# Patient Record
Sex: FEMALE | Race: WHITE | HISPANIC OR LATINO | URBAN - METROPOLITAN AREA
[De-identification: names, ages, dates, MRNs, and addresses within clinical notes are randomized per-mention and may not be internally consistent; named-entity substitution may affect disease eponyms.]

---

## 2019-01-02 ENCOUNTER — WALK IN (OUTPATIENT)
Dept: URGENT CARE | Age: 19
End: 2019-01-02

## 2019-01-02 DIAGNOSIS — Z11.1 SCREENING FOR TUBERCULOSIS: Primary | ICD-10-CM

## 2019-01-02 DIAGNOSIS — Z23 NEED FOR IMMUNIZATION AGAINST INFLUENZA: ICD-10-CM

## 2019-01-02 PROCEDURE — 90630 INFLUENZA QUADRIVALENT SPLIT P FREE INTRADERMAL VACCINE: CPT | Performed by: NURSE PRACTITIONER

## 2019-01-02 PROCEDURE — 86580 TB INTRADERMAL TEST: CPT | Performed by: NURSE PRACTITIONER

## 2019-01-05 ENCOUNTER — WALK IN (OUTPATIENT)
Dept: URGENT CARE | Age: 19
End: 2019-01-05

## 2019-01-05 DIAGNOSIS — Z11.1 VISIT FOR TB SKIN TEST: Primary | ICD-10-CM

## 2019-01-05 DIAGNOSIS — Z11.1 ENCOUNTER FOR PPD SKIN TEST READING: ICD-10-CM

## 2019-01-05 PROCEDURE — X1094 NO CHARGE VISIT: HCPCS | Performed by: NURSE PRACTITIONER

## 2019-01-25 ENCOUNTER — OFFICE VISIT (OUTPATIENT)
Dept: FAMILY MEDICINE CLINIC | Facility: CLINIC | Age: 19
End: 2019-01-25

## 2019-01-25 VITALS
WEIGHT: 161.81 LBS | SYSTOLIC BLOOD PRESSURE: 114 MMHG | HEIGHT: 72 IN | TEMPERATURE: 98 F | RESPIRATION RATE: 14 BRPM | BODY MASS INDEX: 21.92 KG/M2 | DIASTOLIC BLOOD PRESSURE: 78 MMHG | HEART RATE: 78 BPM | OXYGEN SATURATION: 98 %

## 2019-01-25 DIAGNOSIS — Z02.0 SCHOOL PHYSICAL EXAM: Primary | ICD-10-CM

## 2019-01-25 PROCEDURE — 99385 PREV VISIT NEW AGE 18-39: CPT | Performed by: NURSE PRACTITIONER

## 2019-01-25 NOTE — PROGRESS NOTES
CHIEF COMPLAINT:   Patient presents with:  School Physical: up to date vaccines, [employer], does not require titers       HPI:   Hair Katz is a 19year old female who presents for school physical exam. Patient is attending [employer]  ALLERGY/IMM.:  denies food or seasonal allergies    EXAM:   /78   Pulse 78   Temp 98.3 °F (36.8 °C) (Oral)   Resp 14   Ht 72\"   Wt 161 lb 12.8 oz   LMP 01/20/2019   SpO2 98%   Breastfeeding?  No   BMI 21.94 kg/m²   Visual Acuity     [employer] S provide her school with titers showing immunity of which can not be completed here, pt voiced understanding. Form filled out and given to patient. Copy of form sent to be scanned into patient's chart.      Patient needs to f/u with PCP for any chronic

## 2019-02-04 ENCOUNTER — WALK IN (OUTPATIENT)
Dept: URGENT CARE | Age: 19
End: 2019-02-04

## 2019-02-04 DIAGNOSIS — Z11.1 SCREENING-PULMONARY TB: Primary | ICD-10-CM

## 2019-02-04 PROCEDURE — 86580 TB INTRADERMAL TEST: CPT | Performed by: NURSE PRACTITIONER

## 2019-02-06 ENCOUNTER — WALK IN (OUTPATIENT)
Dept: URGENT CARE | Age: 19
End: 2019-02-06

## 2019-02-06 DIAGNOSIS — Z11.1 ENCOUNTER FOR PPD SKIN TEST READING: Primary | ICD-10-CM

## 2019-02-06 PROCEDURE — X1094 NO CHARGE VISIT: HCPCS | Performed by: NURSE PRACTITIONER

## 2019-05-28 ENCOUNTER — WALK IN (OUTPATIENT)
Dept: URGENT CARE | Age: 19
End: 2019-05-28

## 2019-05-28 DIAGNOSIS — Z23 NEED FOR TDAP VACCINATION: Primary | ICD-10-CM

## 2019-05-28 PROCEDURE — 90715 TDAP VACCINE 7 YRS/> IM: CPT | Performed by: NURSE PRACTITIONER

## 2019-05-28 PROCEDURE — 90471 IMMUNIZATION ADMIN: CPT | Performed by: NURSE PRACTITIONER

## 2020-09-27 ENCOUNTER — TELEPHONE (OUTPATIENT)
Dept: SCHEDULING | Age: 20
End: 2020-09-27

## 2020-09-27 ENCOUNTER — WALK IN (OUTPATIENT)
Dept: URGENT CARE | Age: 20
End: 2020-09-27

## 2020-09-27 VITALS — TEMPERATURE: 98.4 F

## 2020-09-27 DIAGNOSIS — Z11.1 SCREENING-PULMONARY TB: Primary | ICD-10-CM

## 2020-09-27 PROCEDURE — 86580 TB INTRADERMAL TEST: CPT | Performed by: NURSE PRACTITIONER

## 2020-09-30 ENCOUNTER — WALK IN (OUTPATIENT)
Dept: URGENT CARE | Age: 20
End: 2020-09-30

## 2020-09-30 VITALS — TEMPERATURE: 98.5 F

## 2020-09-30 DIAGNOSIS — Z11.1 ENCOUNTER FOR PPD SKIN TEST READING: Primary | ICD-10-CM

## 2020-09-30 PROCEDURE — X1094 NO CHARGE VISIT: HCPCS | Performed by: NURSE PRACTITIONER
